# Patient Record
(demographics unavailable — no encounter records)

---

## 2024-11-04 NOTE — PLAN
[FreeTextEntry1] : 39 y/o presenting for colposcopy for LGSIL, HPV+ This year. Had colposcopy 2 years ago that was benign, HPV persistently positive over past 2 years but pap last year was NIL.  - Colposcopy performed, biopsies as above - F/u patho results, further follow up pending results  Leonidas Goldsmith MD

## 2024-11-04 NOTE — PROCEDURE
[Colposcopy] : Colposcopy  [Time out performed] : Pre-procedure time out performed.  Patient's name, date of birth and procedure confirmed. [Consent Obtained] : Consent obtained [Risks] : risks [Benefits] : benefits [Alternatives] : alternatives [Patient] : patient [Infection] : infection [Bleeding] : bleeding [Allergic Reaction] : allergic reaction [LGSIL] : LGSIL [HPV High Risk] : HPV high risk [No Premedication] : no premedication [ECC Performed] : ECC performed [No Abnormalities] : no abnormalities [Lesion] : lesion seen [Biopsy] : biopsy taken [Hemostasis Obtained] : Hemostasis obtained [Tolerated Well] : the patient tolerated the procedure well [SCI Fully Visualized] : SCI not fully visualized [de-identified] : 3 [de-identified] : acetowhite changes at 7 and 11 o'clock [de-identified] : ECC 7 o'clock 11 o'clock

## 2025-01-27 NOTE — HISTORY OF PRESENT ILLNESS
[FreeTextEntry1] : - 1/27/25: returns after bloodwork and DEXA scan. Taking ursodiol.  Exam: 169 lbs, BMI 28.1 Labs 1/11: abnormal: ALT 47., ASMA 1:40, Normal: , BMP, rest of LFTs, INR, IgG 882.   - 10/4/24: returns after bloodwork showed AMA 1:160 and I told her that she has PBC. Ursodiol started on 8/2/24. Fibroscan done today. Feels good, taking ursodiol 250 mg 2-1-1 pills. Exam: 171 lbs, BMI 28 - lost 10 lbs by dieting Labs 7/30: Abnormal: AST//155, . Normal: , creatinine 0.77, INR 0.86.   - 7/30/24: Ms. HERNANDEZ is a 40-year-old woman with hypothyroidism who was referred because of elevated liver enzymes and a fatty liver found in 2021 at [place], and recently increased liver enzymes.  She has not been able to lose weight by dieting. She walks 3 miles/day and goes to the gym 4 times per week. Hypothyroidism was diagnosed at age 25. She ate very restrictively for several years before that with very little carbs or protein, but her lowest weight was still in the 160s with some abdom. fat.  Denies NSAID use or antibiotics other than the last few days for a cough that has improved.    Alcohol history: 2-3 glasses of wine per week. 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 Weight history: 181 lbs, BMI 29.7 in 7/2024 - stable. Overweight since childhood. Max. weight was 195 lbs after her two pregnancies. Remedies/OTC meds: denies    Test results: - 1/7/25 DEXA scan: normal.  - 7/30/24: Abnormal: Ferritin 168. ASMA 1:160, AMA 1:160, IgM 648.                 Normal: HAV IgG, HBsAg, sAb, cAb, HCV Ab, iron panel w/ sat 19%, A1AT MM, ceruloplasmin 27, JORGE LUIS, IgG 787, AFP 3.3 ng/mL - 4/18/24 US abdomen: mildly coarsened hepatic echotexture. Normal gallbladder, CBD, and spleen.  - 4/3/24: Abnormal: AST/ALT 89/186, . LDL, TSH                Normal: , BMP, HbA1c 5.2%.  - 8/16/21: HBsAg-, HCV Ab-

## 2025-01-27 NOTE — ASSESSMENT
[FreeTextEntry1] : Ms. HERNANDEZ is a 40-year-old woman with hypothyroidism since age 25 who was referred in July 2024 because of elevated liver enzymes and a fatty liver found in 2021 at [place], and recently increased liver enzymes.   # PBC, elevated ALP and AMA 1:160 - ursodiol started 8/2/24 - elevated AST//150 in 7/2024. High for PBC - AST normalized, ALT 47 in 1/2025   - elevated ASMA 1:160, but JORGE LUIS negative and IgG 790 (7/2024) - not suggestive of AIH # h/o fatty liver, heterogeneous hepatic parenchyma on US 4/2024 - fibroscan 10/2024 suggested no fibrosis, no steatosis - imaging: US 4/2024: mildly coarsened liver parenchyma  - overweight, BMI 28 - HLD - normal HbA1c - DEXA scan normal 1/2025  Plan: - bloodwork before next visit in 6 months - slighlty low Vitamin D - consider PO Vitamin D

## 2025-01-27 NOTE — HISTORY OF PRESENT ILLNESS
[FreeTextEntry1] : - 1/27/25: returns after bloodwork and DEXA scan. Taking ursodiol.  Exam: 169 lbs, BMI 28.1 Labs 1/11: abnormal: ALT 47., ASMA 1:40, Normal: , BMP, rest of LFTs, INR, IgG 882.   - 10/4/24: returns after bloodwork showed AMA 1:160 and I told her that she has PBC. Ursodiol started on 8/2/24. Fibroscan done today. Feels good, taking ursodiol 250 mg 2-1-1 pills. Exam: 171 lbs, BMI 28 - lost 10 lbs by dieting Labs 7/30: Abnormal: AST//155, . Normal: , creatinine 0.77, INR 0.86.   - 7/30/24: Ms. HERNANDEZ is a 40-year-old woman with hypothyroidism who was referred because of elevated liver enzymes and a fatty liver found in 2021 at [place], and recently increased liver enzymes.  She has not been able to lose weight by dieting. She walks 3 miles/day and goes to the gym 4 times per week. Hypothyroidism was diagnosed at age 25. She ate very restrictively for several years before that with very little carbs or protein, but her lowest weight was still in the 160s with some abdom. fat.  Denies NSAID use or antibiotics other than the last few days for a cough that has improved.    Alcohol history: 2-3 glasses of wine per week. 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 Weight history: 181 lbs, BMI 29.7 in 7/2024 - stable. Overweight since childhood. Max. weight was 195 lbs after her two pregnancies. Remedies/OTC meds: denies    Test results: - 1/7/25 DEXA scan: normal.  - 7/30/24: Abnormal: Ferritin 168. ASMA 1:160, AMA 1:160, IgM 648.                 Normal: HAV IgG, HBsAg, sAb, cAb, HCV Ab, iron panel w/ sat 19%, A1AT MM, ceruloplasmin 27, JORGE LUIS, IgG 787, AFP 3.3 ng/mL - 4/18/24 US abdomen: mildly coarsened hepatic echotexture. Normal gallbladder, CBD, and spleen.  - 4/3/24: Abnormal: AST/ALT 89/186, . LDL, TSH                Normal: , BMP, HbA1c 5.2%.  - 8/16/21: HBsAg-, HCV Ab-

## 2025-01-27 NOTE — ASSESSMENT
[FreeTextEntry1] : Ms. HERNANDEZ is a 40-year-old woman with hypothyroidism since age 25 who was referred in July 2024 because of elevated liver enzymes and a fatty liver found in 2021 at [place], and recently increased liver enzymes.   # PBC, elevated ALP and AMA 1:160 - ursodiol started 8/2/24 - elevated AST//150 in 7/2024. High for PBC - AST normalized, ALT 47 in 1/2025   - elevated ASMA 1:160, but JORGE LUIS negative and IgG 790 (7/2024) - not suggestive of AIH # h/o fatty liver, heterogeneous hepatic parenchyma on US 4/2024 - fibroscan 10/2024 suggested no fibrosis, no steatosis - imaging: US 4/2024: mildly coarsened liver parenchyma  - overweight, BMI 28 - HLD - normal HbA1c - DEXA scan normal 1/2025  Plan: - bloodwork before next visit in 6 months - slighlty low Vitamin D - consider PO Vitamin D

## 2025-01-28 NOTE — PLAN
[FreeTextEntry1] : 39 y/o with HSIL (WOOD 2-3, not specified) on colposcopy biopsy presenting for LEEP today. - LEEP performed successfully as above. - Plastic protector tube used to protect Mirena IUD strings during procedure so as to not cut them during LEEP. Cone of LEEP approached from both sides as a result of this, and additional margins taken at 11 and 12 o'clock to ensure complete excision of transformation zone. - ECC performed after cone excision completed, hemostasis obtained with coag setting of Bovie. - IUD strings were able to be restored to normal position after procedure. Proper fundal IUD placement confirmed to still be present on bedside transabdominal ultrasound at end of procedure - Follow up pathology results  Leonidas Goldsmith MD

## 2025-01-28 NOTE — PROCEDURE
[Time out performed] : Pre-procedure time out performed.  Patient's name, date of birth and procedure confirmed. [Consent Obtained] : Consent obtained [Moderate Dysplasia] : moderate dysplasia [Risks] : risks [Benefits] : benefits [Alternatives] : alternatives [Patient] : patient [Infection] : infection [Bleeding] : bleeding [Allergic Reaction] : allergic reaction [Injury to Vagina] : injury to vagina [Injury to Cervix] : injury to cervix [Negative] : negative pregnancy test [Cutting Setting ___watts] : cutting setting [unfilled] egan [Coag Setting ___watts] : coag setting [unfilled] egan [ECC Done] : ECC done [Hemostasis Obtained] : Hemostasis obtained [Sent to Pathology] : the specimen was placed in buffered formalin and sent for pathology [Tolerated Well] : the patient tolerated the procedure well [de-identified] : 3 mL 2% lidocaine into cervical stroma. Conscious sedation also performed by anesthesia [de-identified] : Lugol's solution used before cutting to highlight areas of abnormality

## 2025-02-16 NOTE — PLAN
[FreeTextEntry1] : 41 y/o presenting for f/u of LEEP on 1/28, recovering well, WOOD 2 with negative margins on LEEP pathology. - Reviewed pathology results, pap smear in 6 months recommended - Reviewed no sex for 4 weeks from procedure - Can return to all normal activity - Return in 6 months for pap  Leonidas Goldsmith MD.

## 2025-02-16 NOTE — HISTORY OF PRESENT ILLNESS
[Pain is well-controlled] : pain is well-controlled [Fever] : no fever [Chills] : no chills [Nausea] : no nausea [Vomiting] : no vomiting [None] : no vaginal bleeding [Normal] : normal [Pathology reviewed] : pathology reviewed [de-identified] : 01/04/2025 [de-identified] : LEEP-sedation  [de-identified] : s/p AG on 1/28 presenting for follow up visit. Reports some discharge still, no other complaints. [de-identified] : Cervix healing well on speculum exam, IUD strings present

## 2025-02-16 NOTE — HISTORY OF PRESENT ILLNESS
[Pain is well-controlled] : pain is well-controlled [Fever] : no fever [Chills] : no chills [Nausea] : no nausea [Vomiting] : no vomiting [None] : no vaginal bleeding [Normal] : normal [Pathology reviewed] : pathology reviewed [de-identified] : 01/04/2025 [de-identified] : LEEP-sedation  [de-identified] : s/p AG on 1/28 presenting for follow up visit. Reports some discharge still, no other complaints. [de-identified] : Cervix healing well on speculum exam, IUD strings present

## 2025-07-28 NOTE — HISTORY OF PRESENT ILLNESS
[FreeTextEntry1] : Ms. Wilson is a 42 yo F with overweight BMI, hypothyroidism, hyperlipidemia, and chronic liver enzyme elevations with AMA-positive primary biliary cholangitis (PBC) diagnosed in 2024 and now on ursodiol (since 8/2024), also with prior laboratory work-up notable for positive ASMA (with 1:160 titer on 7/30/24 and 1:40 titer on 1/11/25). No prior liver biopsy.  Abdominal US (4/18/24): Mildly coarsened hepatic echotexture. No focal hepatic lesion. Patent main portal vein. Normal caliber bile ducts. Normal gallbladder. Spleen at upper limits of normal in size (12.8 cm). No ascites.  FibroScan (10/4/24): Median liver stiffness 6.3 kPa (normal, consistent with F0-1 fibrosis) and CAP score 197 dB/m (normal, consistent with S0 steatosis).  She was last seen in this office by hepatologist Dr. Arpit Mittal on 1/27/25 and is here today for routine follow-up. She remains on ursodiol 250 mg 2 tabs po qam + 1 tab po q afternoon + 1 tab po qpm (unchanged). She has been on Wegovy for a few months, currently at 1 mg subcutaneously weekly, prescribed by her endocrinologist. She has been tolerating it well and losing weight gradually. No excessive daily fatigue or diffuse pruritus. She is feeling well overall.  Her parents are both still living. She does not know her maternal family history well. There is a paternal family history of hyperlipidemia. No known family history of liver disease or autoimmune problems.  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 She has two children (one starting 6th grade and one starting 9th grade in the 8128-6201 school year). Never smoker. No recreational drug use. She drinks alcohol socially (typically 1 glass of wine up to 3 times/week in the summer).

## 2025-07-28 NOTE — ASSESSMENT
[FreeTextEntry1] : # AMA-positive primary biliary cholangitis (PBC): - She was diagnosed with PBC based on chronic liver enzyme elevations and positive AMA with 1:160 titer in 2024. - No prior liver biopsy, but initial FibroScan (10/4/24) suggested stage 0-1 fibrosis. Repeat FibroScan ordered today to be done with her next visit in 6 months for surveillance. - Last abdominal ultrasound (4/18/24) showed mildly coarsened hepatic echotexture and spleen size at the upper limits of normal. Repeat ultrasound ordered today for routine annual surveillance. - Most recent labs (7/21/25) show significant improvement compared to prior to starting ursodiol therapy (since 8/2024) but with ALT still minimally elevated and with GGT not checked recently. - Prior laboratory work-up was notable for positive ASMA (with 1:160 titer on 7/30/24 and 1:40 titer on 1/11/25). - Continue ursodiol, but changed today from 250 mg 4 pills/day to 500 mg po bid for easier dosing. This is an appropriate 13-15 mg/kg/day weight-based dose for her. - We will re-check labs again in 6 months including CMP, GGT, IgG, IgM, and autoimmune serologies. - We discussed today that, pending her next labs, we will discuss whether to consider a percutaneous liver biopsy to rule out overlap syndrome with autoimmune hepatitis and/or adding therapy such as second line therapy for PBC if she is not in complete biochemical remission yet. - We will re-check fat-soluble vitamin levels with her next labs and annually. She has TSH monitored with her endocrinologist and lipids managed by her PCP. - DEXA (1/6/25) had normal bone density. Repeat in 2-3 years. - We discussed the diagnosis of primary biliary cholangitis (PBC) at length today. I reviewed the natural history, evaluation and staging of the disease, and prognosis, including possible risks of development of pruritus, compensated cirrhosis, decompensated cirrhosis, and hepatocellular carcinoma (HCC) as well as increased associated risks of dyslipidemia, thyroid disorders, osteoporosis, and fat-soluble vitamin deficiencies.  # Health maintenance: - She is average risk for colon cancer screening and will need screening starting at age 45 years. - She is HAV and HBV non-immune with vaccinations discussed and recommended today. - Ms. CRECCA was counseled to: abstain from alcohol; avoid use of herbal and dietary supplements due to potential hepatotoxicity; and limit use of acetaminophen to <2 grams per day.   Next follow-up: 6 months

## 2025-07-28 NOTE — PHYSICAL EXAM
[Scleral Icterus] : No Scleral Icterus [Spider Angioma] : No spider angioma(s) were observed [Abdominal  Ascites] : no ascites [Splenomegaly] : no splenomegaly [Caput Medusae] : no caput medusae observed [Non-Tender] : non-tender [Smooth] : smooth [Asterixis] : no asterixis observed [Jaundice] : No jaundice [Palmar Erythema] : no Palmar Erythema [General Appearance - Alert] : alert [General Appearance - In No Acute Distress] : in no acute distress [General Appearance - Well Nourished] : well nourished [General Appearance - Well Developed] : well developed [General Appearance - Well-Appearing] : healthy appearing [Sclera] : the sclera and conjunctiva were normal [Hearing Threshold Finger Rub Not [place]] : hearing was normal [Oropharynx] : the oropharynx was normal [Neck Appearance] : the appearance of the neck was normal [Neck Cervical Mass (___cm)] : no neck mass was observed [Jugular Venous Distention Increased] : there was no jugular-venous distention [Respiration, Rhythm And Depth] : normal respiratory rhythm and effort [Exaggerated Use Of Accessory Muscles For Inspiration] : no accessory muscle use [Auscultation Breath Sounds / Voice Sounds] : lungs were clear to auscultation bilaterally [Heart Rate And Rhythm] : heart rate was normal and rhythm regular [Heart Sounds] : normal S1 and S2 [Edema] : there was no peripheral edema [Breast Appearance] : normal in appearance [Bowel Sounds] : normal bowel sounds [Abdomen Soft] : soft [Abdomen Tenderness] : non-tender [Abdomen Mass (___ Cm)] : no abdominal mass palpated [Abnormal Walk] : normal gait [Nail Clubbing] : no clubbing  or cyanosis of the fingernails [Involuntary Movements] : no involuntary movements were seen [Skin Color & Pigmentation] : normal skin color and pigmentation [Skin Turgor] : normal skin turgor [] : no rash [Oriented To Time, Place, And Person] : oriented to person, place, and time [Impaired Insight] : insight and judgment were intact [Affect] : the affect was normal [Mood] : the mood was normal [Memory Recent] : recent memory was not impaired [Memory Remote] : remote memory was not impaired